# Patient Record
Sex: FEMALE
[De-identification: names, ages, dates, MRNs, and addresses within clinical notes are randomized per-mention and may not be internally consistent; named-entity substitution may affect disease eponyms.]

---

## 2022-10-27 ENCOUNTER — RX ONLY (OUTPATIENT)
Age: 22
Setting detail: RX ONLY
End: 2022-10-27

## 2022-10-27 RX ORDER — AMOXICILLIN 500 MG/1
TABLET, FILM COATED ORAL
Qty: 30 | Refills: 1 | Status: ERX | COMMUNITY
Start: 2022-10-27

## 2022-11-10 ENCOUNTER — RX ONLY (OUTPATIENT)
Age: 22
Setting detail: RX ONLY
End: 2022-11-10

## 2022-11-10 RX ORDER — AMOXICILLIN AND CLAVULANATE POTASSIUM 875; 125 MG/1; MG/1
TABLET, FILM COATED ORAL
Qty: 28 | Refills: 1 | Status: ERX | COMMUNITY
Start: 2022-11-09

## 2022-12-28 ENCOUNTER — APPOINTMENT (RX ONLY)
Dept: URBAN - METROPOLITAN AREA CLINIC 7 | Facility: CLINIC | Age: 22
Setting detail: DERMATOLOGY
End: 2022-12-28

## 2022-12-28 DIAGNOSIS — D485 NEOPLASM OF UNCERTAIN BEHAVIOR OF SKIN: ICD-10-CM

## 2022-12-28 PROBLEM — D48.5 NEOPLASM OF UNCERTAIN BEHAVIOR OF SKIN: Status: ACTIVE | Noted: 2022-12-28

## 2022-12-28 PROCEDURE — ? COUNSELING

## 2022-12-28 PROCEDURE — ? SHAVE REMOVAL (SELF PAY)

## 2022-12-28 ASSESSMENT — LOCATION ZONE DERM
LOCATION ZONE: TRUNK
LOCATION ZONE: ARM
LOCATION ZONE: LEG

## 2022-12-28 ASSESSMENT — LOCATION DETAILED DESCRIPTION DERM
LOCATION DETAILED: RIGHT POSTERIOR SHOULDER
LOCATION DETAILED: LEFT CLAVICULAR SKIN
LOCATION DETAILED: LEFT DISTAL CALF
LOCATION DETAILED: RIGHT INFERIOR UPPER BACK

## 2022-12-28 ASSESSMENT — LOCATION SIMPLE DESCRIPTION DERM
LOCATION SIMPLE: RIGHT UPPER BACK
LOCATION SIMPLE: RIGHT SHOULDER
LOCATION SIMPLE: LEFT CLAVICULAR SKIN
LOCATION SIMPLE: LEFT CALF

## 2022-12-28 NOTE — PROCEDURE: SHAVE REMOVAL (SELF PAY)
Body Location Override (Optional - Billing Will Still Be Based On Selected Body Map Location If Applicable): left posterior mid calf
Detail Level: Detailed
Size Of Lesion In Cm (Required): 0.3
X Size Of Lesion In Cm (Optional): 0
Depth Of Shave: dermis
Biopsy Method: Dermablade
Anesthesia Type: 1% lidocaine with epinephrine
Hemostasis: Electrocautery
Wound Care: Petrolatum
Lab: [id number] [facility]
Lab Facility: [address] [place] [address]
Path Notes (To The Dermatopathologist): Size: 0.3cm
Render Path Notes In Note?: No
Medical Necessity Clause: This procedure was medically necessary because the lesion that was treated was:
Consent was obtained from the patient. The risks and benefits to therapy were discussed in detail. Specifically, the risks of infection, scarring, bleeding, prolonged wound healing, incomplete removal, allergy to anesthesia, nerve injury and recurrence were addressed. Prior to the procedure, the treatment site was clearly identified and confirmed by the patient. All components of Universal Protocol/PAUSE Rule completed.
Post-Care Instructions: I reviewed with the patient in detail post-care instructions. Patient is to keep the biopsy site dry overnight, and then apply bacitracin twice daily until healed. Patient may apply hydrogen peroxide soaks to remove any crusting.
Notification Instructions: Patient will be notified of biopsy results. However, patient instructed to call the office if not contacted within 2 weeks.
Billing Type: [employer]
Body Location Override (Optional - Billing Will Still Be Based On Selected Body Map Location If Applicable): right lateral mid back
Size Of Lesion In Cm (Required): 0.4
Lab: [id number]
Lab Facility: [id number]
Path Notes (To The Dermatopathologist): Size: 0.4cm
Billing Type: Third-Party Bill
Body Location Override (Optional - Billing Will Still Be Based On Selected Body Map Location If Applicable): right posterior shoulder
Hemostasis: Drysol
Body Location Override (Optional - Billing Will Still Be Based On Selected Body Map Location If Applicable): left lateral clavicle
Size Of Lesion In Cm (Required): 0.2
Path Notes (To The Dermatopathologist): Size: 0.2cm

## 2024-09-02 ENCOUNTER — RX ONLY (OUTPATIENT)
Age: 24
Setting detail: RX ONLY
End: 2024-09-02

## 2024-09-02 RX ORDER — TRETIONIN 1 MG/G
CREAM TOPICAL
Qty: 45 | Refills: 3 | Status: ERX | COMMUNITY
Start: 2024-09-02

## 2024-09-02 RX ORDER — TRETIONIN 0.25 MG/G
CREAM TOPICAL
Qty: 45 | Refills: 3 | Status: ERX | COMMUNITY
Start: 2024-09-02

## 2024-10-06 ENCOUNTER — RX ONLY (OUTPATIENT)
Age: 24
Setting detail: RX ONLY
End: 2024-10-06

## 2024-10-06 RX ORDER — TRETIONIN 0.5 MG/G
CREAM TOPICAL
Qty: 45 | Refills: 1 | Status: ERX | COMMUNITY
Start: 2024-10-06

## 2024-10-06 RX ORDER — HYDROQUINONE 40 MG/G
CREAM TOPICAL
Qty: 28.35 | Refills: 1 | Status: ERX | COMMUNITY
Start: 2024-10-06

## 2025-01-06 ENCOUNTER — RX ONLY (RX ONLY)
Age: 25
End: 2025-01-06

## 2025-01-06 ENCOUNTER — APPOINTMENT (OUTPATIENT)
Dept: URBAN - METROPOLITAN AREA CLINIC 7 | Facility: CLINIC | Age: 25
Setting detail: DERMATOLOGY
End: 2025-01-06

## 2025-01-06 DIAGNOSIS — L70.0 ACNE VULGARIS: ICD-10-CM

## 2025-01-06 PROCEDURE — ? ORDER TESTS

## 2025-01-06 RX ORDER — DROSPIRENONE AND ETHINYL ESTRADIOL 0.02-3(28)
KIT ORAL
Qty: 3 | Refills: 2 | Status: ERX | COMMUNITY
Start: 2025-01-06

## 2025-01-07 ENCOUNTER — RX ONLY (RX ONLY)
Age: 25
End: 2025-01-07

## 2025-01-07 RX ORDER — DROSPIRENONE AND ETHINYL ESTRADIOL 0.02-3(28)
KIT ORAL
Qty: 3 | Refills: 2 | Status: ERX

## 2025-03-14 ENCOUNTER — RX ONLY (RX ONLY)
Age: 25
End: 2025-03-14

## 2025-03-14 RX ORDER — CEPHALEXIN 500 MG/1
CAPSULE ORAL
Qty: 28 | Refills: 0 | Status: ERX | COMMUNITY
Start: 2025-03-14

## 2025-03-14 RX ORDER — AMOXICILLIN AND CLAVULANATE POTASSIUM 875; 125 MG/1; MG/1
TABLET, FILM COATED ORAL
Qty: 28 | Refills: 1 | Status: ERX

## 2025-05-18 ENCOUNTER — RX ONLY (RX ONLY)
Age: 25
End: 2025-05-18

## 2025-05-18 RX ORDER — CLINDAMYCIN PHOSPHATE AND BENZOYL PEROXIDE 1 %-5 %
KIT TOPICAL
Qty: 50 | Refills: 1 | Status: ERX | COMMUNITY
Start: 2025-05-18

## 2025-05-18 RX ORDER — DROSPIRENONE AND ETHINYL ESTRADIOL 0.02-3(28)
KIT ORAL
Qty: 3 | Refills: 2 | Status: ERX

## 2025-05-18 RX ORDER — TRETIONIN 0.25 MG/G
CREAM TOPICAL
Qty: 45 | Refills: 2 | Status: ERX

## 2025-07-21 ENCOUNTER — RX ONLY (RX ONLY)
Age: 25
End: 2025-07-21

## 2025-07-21 RX ORDER — ISOTRETINOIN 20 MG/1
CAPSULE, LIQUID FILLED ORAL
Qty: 60 | Refills: 0 | Status: ERX | COMMUNITY
Start: 2025-07-21

## 2025-07-26 ENCOUNTER — RX ONLY (RX ONLY)
Age: 25
End: 2025-07-26

## 2025-07-26 RX ORDER — TRETIONIN 0.25 MG/G
CREAM TOPICAL
Qty: 45 | Refills: 1 | Status: ERX

## 2025-08-20 ENCOUNTER — RX ONLY (RX ONLY)
Age: 25
End: 2025-08-20

## 2025-08-20 RX ORDER — ISOTRETINOIN 20 MG/1
CAPSULE, LIQUID FILLED ORAL
Qty: 60 | Refills: 0 | Status: ERX

## 2025-08-29 ENCOUNTER — RX ONLY (RX ONLY)
Age: 25
End: 2025-08-29

## 2025-08-29 RX ORDER — TRETIONIN 0.25 MG/G
GEL TOPICAL
Qty: 45 | Refills: 1 | Status: ERX | COMMUNITY
Start: 2025-08-29

## 2025-08-29 RX ORDER — TRETIONIN 0.5 MG/G
CREAM TOPICAL
Qty: 45 | Refills: 2 | Status: ERX